# Patient Record
Sex: MALE | Race: WHITE | Employment: FULL TIME | URBAN - METROPOLITAN AREA
[De-identification: names, ages, dates, MRNs, and addresses within clinical notes are randomized per-mention and may not be internally consistent; named-entity substitution may affect disease eponyms.]

---

## 2020-06-18 NOTE — PROGRESS NOTES
[facility]   Cardiac Evaluation      Patient: Anamaria Tavares  YOB: 1950  curing       Chief Complaint   Patient presents with    Chest Pain        Referring provider: Jean Claude Real MD    History of Present Illness:  Mr. Micah Christensen is here at the request of Dr. Meron Gamboa () for evaluation of chest pain. He has a history of hypertension and hyperlipidemia. Additional history includes GERD, back pain/chronic pain, SOUMYA    At his recent visit with PCP, he reported continued chest pain, dyspnea on exertion, and worsening reflux symptoms in the evening after taking PPI in the am.  Omeprazole was increased to BID dosing last month by his PCP. He was diagnosed with sleep apnea years ago, but he could not tolerate CPAP. He was referred again to sleep medicine for re-evaluation. Today, patient describes chest pain as a stabbing chest pain, located on either side of chest, precipitated by emotional stress but not with exertion, no radiation of pain, no associated symptoms. Chest discomfort can last up to a few minutes and resolves without specific treatment. Patient has dyspnea on exertion independent of chest discomfort. He had shoulder, knee, and back surgery within the past 5 years and is in constant pain. The only surgery that was successful was his shoulder. Patient is a nonsmoker. His father had surgery CABG and valve replacement about age 79. With regard to medication therapy he/she has been compliant with prescribed regimen and has tolerated therapy to date. Past Medical History:   has a past medical history of Anxiety, Depression, GERD (gastroesophageal reflux disease), Hypertension, Hypogonadism, Impotence, and Seasonal allergic rhinitis. Surgical History:   has a past surgical history that includes Mandible surgery (fx); Cervical spine surgery (2014); Knee Arthroplasty (Left, 2014); shoulder surgery (Left, 2014); and Knee Arthroplasty (Right, 2019). Current Outpatient Medications   Medication Sig Dispense Refill    traZODone (DESYREL) 50 MG tablet Take 150 mg by mouth nightly      cefadroxil (DURICEF) 500 MG capsule Take 500 mg by mouth 2 times daily      omeprazole (PRILOSEC) 20 MG delayed release capsule Take 20 mg by mouth 2 times daily      amLODIPine (NORVASC) 10 MG tablet Take 10 mg by mouth daily      pravastatin (PRAVACHOL) 40 MG tablet TAKE 1 TABLET BY MOUTH EVERY EVENING 90 tablet 1    HYDROcodone-acetaminophen (NORCO) 7.5-325 MG per tablet Take 1 tablet by mouth. Take 1 tablet by mouth every 6 hours as needed for Pain. Pt had accident and this is an additional med for this month      finasteride (PROPECIA) 1 MG tablet Take 1 tablet by mouth daily. 30 tablet 11     No current facility-administered medications for this visit. Allergies:  Patient has no known allergies.      Social History:  Social History     Socioeconomic History    Marital status:      Spouse name: Not on file    Number of children: Not on file    Years of education: Not on file    Highest education level: Not on file   Occupational History    Not on file   Social Needs    Financial resource strain: Not on file    Food insecurity     Worry: Not on file     Inability: Not on file   [employer] needs     Medical: Not on file     Non-medical: Not on file   Tobacco Use    Smoking status: Never Smoker    Smokeless tobacco: Never Used   Substance and Sexual Activity    Alcohol use: Yes     Comment: 1-2 per month    Drug use: No    Sexual activity: Yes     Partners: Female   Lifestyle    Physical activity     Days per week: Not on file     Minutes per session: Not on file    Stress: Not on file   Relationships    Social connections     Talks on phone: Not on file     Gets together: Not on file     Attends Latter day service: Not on file     Active member of club or organization: Not on file     Attends meetings of clubs or organizations: Not on

## 2020-06-19 ENCOUNTER — OFFICE VISIT (OUTPATIENT)
Dept: CARDIOLOGY CLINIC | Age: 70
End: 2020-06-19
Payer: MEDICARE

## 2020-06-19 VITALS
BODY MASS INDEX: 32.23 KG/M2 | HEART RATE: 79 BPM | WEIGHT: 238 LBS | HEIGHT: 72 IN | DIASTOLIC BLOOD PRESSURE: 88 MMHG | SYSTOLIC BLOOD PRESSURE: 134 MMHG

## 2020-06-19 PROBLEM — R07.9 CHEST PAIN: Status: ACTIVE | Noted: 2020-06-19

## 2020-06-19 PROCEDURE — 99204 OFFICE O/P NEW MOD 45 MIN: CPT | Performed by: INTERNAL MEDICINE

## 2020-06-19 PROCEDURE — 93000 ELECTROCARDIOGRAM COMPLETE: CPT | Performed by: INTERNAL MEDICINE

## 2020-06-19 RX ORDER — CEFADROXIL 500 MG/1
500 CAPSULE ORAL 2 TIMES DAILY
COMMUNITY

## 2020-06-19 RX ORDER — AMLODIPINE BESYLATE 10 MG/1
10 TABLET ORAL DAILY
COMMUNITY

## 2020-06-19 RX ORDER — TRAZODONE HYDROCHLORIDE 50 MG/1
150 TABLET ORAL NIGHTLY
COMMUNITY

## 2020-06-19 RX ORDER — AMLODIPINE BESYLATE 10 MG/1
10 TABLET ORAL DAILY
COMMUNITY
End: 2020-06-19 | Stop reason: CLARIF

## 2020-06-19 RX ORDER — OMEPRAZOLE 20 MG/1
20 CAPSULE, DELAYED RELEASE ORAL 2 TIMES DAILY
COMMUNITY

## 2020-06-19 NOTE — PATIENT INSTRUCTIONS
1.  No change in medications  2. Schedule Myoview GXT soon  3. Will determine the need for follow up after reviewing test results.

## 2020-06-19 NOTE — LETTER
Comment: 1-2 per month    Drug use: No    Sexual activity: Yes     Partners: Female   Lifestyle    Physical activity     Days per week: Not on file     Minutes per session: Not on file    Stress: Not on file   Relationships    Social connections     Talks on phone: Not on file     Gets together: Not on file     Attends Taoism service: Not on file     Active member of club or organization: Not on file     Attends meetings of clubs or organizations: Not on file     Relationship status: Not on file    Intimate partner violence     Fear of current or ex partner: Not on file     Emotionally abused: Not on file     Physically abused: Not on file     Forced sexual activity: Not on file   Other Topics Concern    Not on file   Social History Narrative    Not on file       Family History:   Family History   Problem Relation Age of Onset    Cancer Mother     Cervical Cancer Mother     Other Father         valvr replacement    Alzheimer's Disease Father     Stroke Father     Coronary Art Dis Father      Family history has been reviewed and not pertinent except as noted above. Review of Systems:   · Constitutional: there has been no unanticipated weight loss. No change in energy or activity level   · Eyes: No visual changes   · ENT: No Headaches, hearing loss or vertigo. No mouth sores or sore throat. · Cardiovascular: Reviewed in HPI  · Respiratory: No cough or wheezing, no sputum production. BORDEN  · Gastrointestinal: No abdominal pain, appetite loss, blood in stools. No change in bowel or bladder habits. · Genitourinary: No nocturia, dysuria, trouble voiding  · Musculoskeletal:  No gait disturbance, weakness or joint complaints. Has joint pain  · Integumentary: No rash or pruritis. · Neurological: No headache, change in muscle strength, numbness or tingling. No change in gait, balance, coordination, mood, affect, memory, mentation, behavior.   · Psychiatric: No anxiety or depression

## 2020-06-29 ENCOUNTER — HOSPITAL ENCOUNTER (OUTPATIENT)
Dept: NON INVASIVE DIAGNOSTICS | Age: 70
Discharge: HOME | End: 2020-06-29
Payer: MEDICARE

## 2020-06-29 LAB
LV EF: 70 %
LVEF MODALITY: NORMAL

## 2020-06-29 PROCEDURE — A9502 TC99M TETROFOSMIN: HCPCS | Performed by: INTERNAL MEDICINE

## 2020-06-29 PROCEDURE — 78452 HT MUSCLE IMAGE SPECT MULT: CPT | Performed by: INTERNAL MEDICINE

## 2020-06-29 PROCEDURE — 93017 CV STRESS TEST TRACING ONLY: CPT | Performed by: INTERNAL MEDICINE

## 2020-06-29 PROCEDURE — 3430000000 HC RX DIAGNOSTIC RADIOPHARMACEUTICAL: Performed by: INTERNAL MEDICINE

## 2020-06-29 PROCEDURE — 6360000002 HC RX W HCPCS: Performed by: INTERNAL MEDICINE

## 2020-06-29 RX ORDER — AMINOPHYLLINE DIHYDRATE 25 MG/ML
100 INJECTION, SOLUTION INTRAVENOUS ONCE
Status: COMPLETED | OUTPATIENT
Start: 2020-06-29 | End: 2020-06-29

## 2020-06-29 RX ADMIN — AMINOPHYLLINE DIHYDRATE 100 MG: 25 INJECTION, SOLUTION INTRAVENOUS at 14:28

## 2020-06-29 RX ADMIN — REGADENOSON 0.4 MG: 0.08 INJECTION, SOLUTION INTRAVENOUS at 14:25

## 2020-06-29 RX ADMIN — TETROFOSMIN 30 MILLICURIE: 1.38 INJECTION, POWDER, LYOPHILIZED, FOR SOLUTION INTRAVENOUS at 14:29

## 2020-06-29 RX ADMIN — TETROFOSMIN 10 MILLICURIE: 1.38 INJECTION, POWDER, LYOPHILIZED, FOR SOLUTION INTRAVENOUS at 12:52

## 2022-04-26 ENCOUNTER — OFFICE VISIT (OUTPATIENT)
Dept: NEUROLOGY | Age: 72
End: 2022-04-26
Payer: MEDICARE

## 2022-04-26 VITALS
WEIGHT: 238 LBS | BODY MASS INDEX: 32.23 KG/M2 | HEART RATE: 56 BPM | DIASTOLIC BLOOD PRESSURE: 102 MMHG | SYSTOLIC BLOOD PRESSURE: 159 MMHG | HEIGHT: 72 IN

## 2022-04-26 DIAGNOSIS — G31.84 MILD COGNITIVE IMPAIRMENT, SO STATED: Primary | ICD-10-CM

## 2022-04-26 DIAGNOSIS — G47.33 OBSTRUCTIVE SLEEP APNEA: ICD-10-CM

## 2022-04-26 DIAGNOSIS — R41.3 MEMORY LOSS: ICD-10-CM

## 2022-04-26 PROCEDURE — 99204 OFFICE O/P NEW MOD 45 MIN: CPT | Performed by: PSYCHIATRY & NEUROLOGY

## 2022-04-26 RX ORDER — LISINOPRIL 20 MG/1
TABLET ORAL
COMMUNITY
Start: 2022-04-15 | End: 2022-10-03

## 2022-04-26 RX ORDER — DULOXETIN HYDROCHLORIDE 60 MG/1
CAPSULE, DELAYED RELEASE ORAL
COMMUNITY
Start: 2022-02-11

## 2022-04-26 NOTE — PROGRESS NOTES
NEUROLOGY CONSULTATION     Chief Complaint   Patient presents with    Memory Loss       HISTORY OF PRESENT ILLNESS :    Israel Bonner is a 81 y.o. male who is referred by Dr. Nakita Gomez   History was obtained from patient  Additional history was obtained from his family. Patient was referred for evaluation of memory impairment. Patient stated the symptoms started approximately 2007. Onset was gradual but the symptoms are slowly getting worse. He had some testing done at [facility].  He was not satisfied with the results. Patient has obstructive sleep apnea but does not use a CPAP machine. Patient was tested several years ago for that. Patient also has significant cervical spine issues and has had cervical spine fusion. Patient has chronic pain and is on hydrocodone. Patient has hypertension and takes Norvasc. She has hyperlipidemia and is on Pravachol. He uses Desyrel   Patient has diabetes and is on metformin.     REVIEW OF SYSTEMS    Constitutional:  []   Chills   [x]  Fatigue   []  Fevers   []  Malaise   []  Weight loss     [] Denies all of the above    Eyes:  [x]  Double vision   [x]  Blurry vision     [] Denies all of the above    Ears, nose, mouth, throat, and face:   [x] Hearing loss    []   Hoarseness      []  Snoring    [x]  Tinnitus       [] Denies all of the above     Respiratory:   [x]  Cough    [x]  Shortness of breath         [] Denies all of the above     Cardiovascular:   [x]  Chest pain    [x]  Exertional chest pressure/discomfort           [] Palpitations    []  Syncope     [] Denies all of the above    Gastrointestinal:   []  Abdominal pain   [x]  Constipation    []  Diarrhea    []   Dysphagia                      [] Denies all of the above    Genitourinary:      [x]  Frequency   []  Hematuria     [x]  Urinary incontinence           [] Denies all of the above     Hematologic/lymphatic:  []  Bleeding []  Easy bruising   []  Anemia  [x] Denies all of the above     Musculoskeletal:   [x] Back pain       []  Myalgias    [x]  Neck pain           [] Denies all of the above    Neurological: As noted in HPI    Behavioral/Psych:   [x] Anxiety    [x]  Depression     []  Mood swings     [] Denies all of the above     Endocrine:   []  Temperature intolerance     [x] Fatigue      [] Denies all of the above     Allergic/Immunologic:   [] Hay fever    [x] Denies all of the above     Past Medical History:   Diagnosis Date    Anxiety     Depression     GERD (gastroesophageal reflux disease)     Hypertension     Hypogonadism     Impotence     Memory disorder     Seasonal allergic rhinitis      Family History   Problem Relation Age of Onset    Cancer Mother     Cervical Cancer Mother     Other Father         valvr replacement    Alzheimer's Disease Father     Stroke Father     Coronary Art Dis Father      Social History     Socioeconomic History    Marital status:      Spouse name: None    Number of children: None    Years of education: None    Highest education level: None   Occupational History    None   Tobacco Use    Smoking status: Never Smoker    Smokeless tobacco: Never Used   Vaping Use    Vaping Use: Never used   Substance and Sexual Activity    Alcohol use: Yes     Comment: 1-2 per month    Drug use: No    Sexual activity: Yes     Partners: Female   Other Topics Concern    None   Social History Narrative    None     Social Determinants of Health     Financial Resource Strain:     Difficulty of Paying Living Expenses: Not on file   Food Insecurity:     Worried About Running Out of Food in the Last Year: Not on file    Deedee of Food in the Last Year: Not on file   Transportation Needs:     Lack of Transportation (Medical): Not on file    Lack of Transportation (Non-Medical):  Not on file   Physical Activity:     Days of Exercise per Week: Not on file    Minutes of Exercise per Session: Not on file   Stress:     Feeling of Stress : Not on file   Social Connections:     Frequency of Communication with Friends and Family: Not on file    Frequency of Social Gatherings with Friends and Family: Not on file    Attends Caodaism Services: Not on file    Active Member of [address] [place] [address] or Organizations: Not on file    Attends Club or Organization Meetings: Not on file    Marital Status: Not on file   Intimate Partner Violence:     Fear of Current or Ex-Partner: Not on file    Emotionally Abused: Not on file    Physically Abused: Not on file    Sexually Abused: Not on file   Housing Stability:     Unable to Pay for Housing in the Last Year: Not on file    Number of [place] in the Last Year: Not on file    Unstable Housing in the Last Year: Not on file       PHYSICAL EXAMINATION:  BP (!) 159/102   Pulse 56   Ht 6' (1.829 m)   Wt 238 lb (108 kg)   BMI 32.28 kg/m²   Appearance: Well appearing, well nourished and in no distress  Mental Status Exam: Patient is alert, oriented to person, place and time. Recent and remote memory is normal she could recall 3 out of 3 objects in 3 minutes. She was unable to do serial sevens. Fund of Knowledge is normal  Attention/concentration is normal.   Speech : No dysarthria  Language : No aphasia  Patient scored 23 out of 30 on a Mini-Mental status testing. Funduscopic Exam: sharp disc margins  Cranial Nerves:   II: Visual fields:  Full to confrontation  III: Pupils:  equal, round, reactive to light  III,IV,VI: Extra Ocular Movements are intact.  No nystagmus  V: Facial sensation is intact to pin prick and light touch  VII: Facial strength and movements: intact and symmetric smile,cheek puffing and eyebrow elevation  VIII: Hearing:  Intact to finger rub bilaterally  IX: Palate  elevation is symmetric  XI: Shoulder shrug is intact  XII: Tongue movements are normal  Motor:  Muscle tone and bulk are normal.   Strength is symmetrical 5/5 in all four extremities. Sensory: Intact to light touch and  pin prick in all four extremities  Coordination:  Normal  Finger to Nose and Heel to Shin bilaterally    . Reflexes:  DTR 1 and symmetric bilaterally  Plantar response: Withdrawal response bilaterally  Gait: Significant difficulty because of his cervical spinal cord problems  Romberg: Could not be tested  Vascular: No carotid bruit bilaterally        DATA:  Testing from outside hospital was reviewed. Patient has not had any recent brain scans done. IMPRESSION :  Mild cognitive impairment versus early dementia  Patient scored 23 out of 30 on the Mini-Mental status testing in the office today  Obstructive sleep apnea  Rule out metabolic etiology  Rule out any frontal lobe lesion    RECOMMENDATIONS :  Discussed with patient and his family  I will get a CT head with contrast  I will check TSH and B12 levels  I will refer the patient for the sleep study  I have recommended that he take the least amount of pain medication possible  I will see him back in 4 months for follow-up  Thank you for this consultation        Please note a portion of this chart was generated using dragon dictation software. Although every effort was made to ensure the accuracy of this automated transcription, some errors in transcription may have occurred.

## 2022-06-15 ENCOUNTER — TELEPHONE (OUTPATIENT)
Dept: NEUROLOGY | Age: 72
End: 2022-06-15

## 2022-06-15 NOTE — TELEPHONE ENCOUNTER
[facility] called pt needs a pa on CT of Head done 06/15.   Per PA dept states his insurance has 7 days

## 2022-06-17 NOTE — TELEPHONE ENCOUNTER
Spoke with pt insurance Michelle who redirected me to [facility] to generate a PA for CT scan. After speaking with Sarah MELISSA at [facility], I was informed if the pt received the CT scan prior to PA approval then the plan will not approve the scan. I contacted pt to confirm rather or not he has had the CT done already and the pt stated that he had. CT scan done 6/16/2022    I have advised pt of the denial and that he may received a hefty bill from [place]. Pt stated that he will not pay it but he does understand it.     Reference number giver was representative  Name and date: [id number]/17/2022

## 2022-08-30 ENCOUNTER — OFFICE VISIT (OUTPATIENT)
Dept: NEUROLOGY | Age: 72
End: 2022-08-30
Payer: MEDICARE

## 2022-08-30 VITALS
HEART RATE: 98 BPM | BODY MASS INDEX: 31.15 KG/M2 | WEIGHT: 230 LBS | HEIGHT: 72 IN | DIASTOLIC BLOOD PRESSURE: 84 MMHG | SYSTOLIC BLOOD PRESSURE: 131 MMHG

## 2022-08-30 DIAGNOSIS — G47.33 OBSTRUCTIVE SLEEP APNEA: ICD-10-CM

## 2022-08-30 DIAGNOSIS — G31.84 MILD COGNITIVE IMPAIRMENT, SO STATED: Primary | ICD-10-CM

## 2022-08-30 DIAGNOSIS — R41.3 MEMORY LOSS: ICD-10-CM

## 2022-08-30 PROCEDURE — 1123F ACP DISCUSS/DSCN MKR DOCD: CPT | Performed by: PSYCHIATRY & NEUROLOGY

## 2022-08-30 PROCEDURE — 99214 OFFICE O/P EST MOD 30 MIN: CPT | Performed by: PSYCHIATRY & NEUROLOGY

## 2022-08-30 NOTE — PROGRESS NOTES
[facility]   Neurology followup    Subjective:   CC/HP  History was obtained from the patient. Additional history was obtained from his wife. Patient is here for follow-up visit. She is main concern is neck pain and bilateral shoulder pain. Patient has had previous cervical disc surgery and is being followed by neurosurgery. He is in pain and takes pain medication. He still has memory problems. It seems to be slowly improving compared to the time that she was right after the surgery. Patient had a CT head done at an outside hospital which showed mild lateral ventricular enlargement felt to be due to cerebral atrophy.   TSH and B12 levels were normal.    REVIEW OF SYSTEMS    Constitutional:  []   Chills   [x]  Fatigue   []  Fevers   []  Malaise   []  Weight loss     [] Denies all of the above    Respiratory:   []  Cough    [x]  Shortness of breath         [] Denies all of the above     Cardiovascular:   []  Chest pain    []  Exertional chest pressure/discomfort           [] Palpitations    []  Syncope     [x] Denies all of the above        Past Medical History:   Diagnosis Date    Anxiety     Depression     GERD (gastroesophageal reflux disease)     Hypertension     Hypogonadism     Impotence     Memory disorder     Seasonal allergic rhinitis      Family History   Problem Relation Age of Onset    Cancer Mother     Cervical Cancer Mother     Other Father         valvr replacement    Alzheimer's Disease Father     Stroke Father     Coronary Art Dis Father      Social History     Socioeconomic History    Marital status:    Tobacco Use    Smoking status: Never    Smokeless tobacco: Never   Vaping Use    Vaping Use: Never used   Substance and Sexual Activity    Alcohol use: Yes     Comment: 1-2 per month    Drug use: No    Sexual activity: Yes     Partners: Female        Objective:  Exam:  /84   Pulse 98   Ht 6' (1.829 m)   Wt 230 lb (104.3 kg)   BMI 31.19 kg/m²   This is a well-nourished patient in no acute distress  Patient is awake, alert and oriented x3. Speech is normal.  Pupils are equal round reacting to light. Extraocular movements intact. Face symmetrical. Tongue midline. Motor exam shows normal symmetrical strength. Deep tendon reflexes normal. Plantar reflexes downgoing. Sensory exam normal. Coordination normal. Gait normal. No carotid bruit. No neck stiffness. Data :  LABS:  General Labs:  CBC:   Lab Results   Component Value Date/Time    WBC 10.3 04/16/2013 04:18 PM    RBC 4.42 04/16/2013 04:18 PM    HGB 13.8 04/16/2013 04:18 PM    HCT 41.7 04/16/2013 04:18 PM    MCV 94.4 04/16/2013 04:18 PM    MCH 31.1 04/16/2013 04:18 PM    MCHC 33.0 04/16/2013 04:18 PM    RDW 13.5 04/16/2013 04:18 PM     04/16/2013 04:18 PM    MPV 9.6 04/16/2013 04:18 PM     BMP:    Lab Results   Component Value Date/Time     04/16/2013 04:18 PM    K 4.8 04/16/2013 04:18 PM     04/16/2013 04:18 PM    CO2 24 04/16/2013 04:18 PM    BUN 16 04/16/2013 04:18 PM    LABALBU 4.8 04/16/2013 04:18 PM    CREATININE 1.0 04/16/2013 04:18 PM    CALCIUM 10.2 04/16/2013 04:18 PM    GFRAA >60 04/16/2013 04:18 PM    GLUCOSE 102 04/16/2013 04:18 PM     RADIOLOGY REVIEW:  I have reviewed radiology report(s) of: CT scan of the head from June 2022    Impression :  Mild cognitive impairment  Lateral ventricular enlargement seen on CT head felt to be due to cerebral atrophy  Obstructive sleep apnea  Status post cervical surgery with still persistent neck pain and bilateral shoulder pain    Plan :  Patient and his family are obviously frustrated that nobody has been able to help him with the neck pain and the shoulder symptoms. Patient will follow with his neurosurgeon for that. I have recommended a repeat CT head to be done maybe in the spring 2023 and to compare it to the previous CT head to see about the ventricular enlargement. This can be done through his primary care physician.   If there is any significant change in the CT size and I will be happy to follow him. I would still recommend that he follow-up with a sleep medicine doctor and get tested again for sleep apnea. This would also help his memory to some extent. Please note a portion of  this chart was generated using dragon dictation software. Although every effort was made to ensure the accuracy of this automated transcription, some errors in transcription may have occurred.

## 2022-10-03 ENCOUNTER — OFFICE VISIT (OUTPATIENT)
Dept: PULMONOLOGY | Age: 72
End: 2022-10-03
Payer: MEDICARE

## 2022-10-03 VITALS
WEIGHT: 234 LBS | BODY MASS INDEX: 31.69 KG/M2 | HEIGHT: 72 IN | SYSTOLIC BLOOD PRESSURE: 140 MMHG | OXYGEN SATURATION: 97 % | DIASTOLIC BLOOD PRESSURE: 80 MMHG | HEART RATE: 102 BPM

## 2022-10-03 DIAGNOSIS — G47.33 OBSTRUCTIVE SLEEP APNEA SYNDROME: Primary | ICD-10-CM

## 2022-10-03 DIAGNOSIS — K21.9 GASTROESOPHAGEAL REFLUX DISEASE WITHOUT ESOPHAGITIS: Chronic | ICD-10-CM

## 2022-10-03 DIAGNOSIS — E66.9 NON MORBID OBESITY, UNSPECIFIED OBESITY TYPE: Chronic | ICD-10-CM

## 2022-10-03 DIAGNOSIS — I10 PRIMARY HYPERTENSION: Chronic | ICD-10-CM

## 2022-10-03 DIAGNOSIS — F11.90 CHRONIC NARCOTIC USE: Chronic | ICD-10-CM

## 2022-10-03 PROCEDURE — 1123F ACP DISCUSS/DSCN MKR DOCD: CPT | Performed by: INTERNAL MEDICINE

## 2022-10-03 PROCEDURE — 99204 OFFICE O/P NEW MOD 45 MIN: CPT | Performed by: INTERNAL MEDICINE

## 2022-10-03 ASSESSMENT — SLEEP AND FATIGUE QUESTIONNAIRES
HOW LIKELY ARE YOU TO NOD OFF OR FALL ASLEEP WHILE SITTING AND TALKING TO SOMEONE: 0
HOW LIKELY ARE YOU TO NOD OFF OR FALL ASLEEP WHILE SITTING INACTIVE IN A PUBLIC PLACE: 0
HOW LIKELY ARE YOU TO NOD OFF OR FALL ASLEEP WHILE LYING DOWN TO REST IN THE AFTERNOON WHEN CIRCUMSTANCES PERMIT: 2
ESS TOTAL SCORE: 9
HOW LIKELY ARE YOU TO NOD OFF OR FALL ASLEEP WHILE SITTING AND READING: 2
HOW LIKELY ARE YOU TO NOD OFF OR FALL ASLEEP WHILE SITTING QUIETLY AFTER LUNCH WITHOUT ALCOHOL: 3
HOW LIKELY ARE YOU TO NOD OFF OR FALL ASLEEP WHILE WATCHING TV: 2
HOW LIKELY ARE YOU TO NOD OFF OR FALL ASLEEP WHEN YOU ARE A PASSENGER IN A CAR FOR AN HOUR WITHOUT A BREAK: 0
HOW LIKELY ARE YOU TO NOD OFF OR FALL ASLEEP IN A CAR, WHILE STOPPED FOR A FEW MINUTES IN TRAFFIC: 0

## 2022-10-03 NOTE — PROGRESS NOTES
Jose Elias Reynoso [facility]  [address] [facility], [address] [facility]- [phone]   [facility] Shaun Beal. [address] [place] [address]. Vi Bull [phone]     [address]  [address] [address]  Dept: [phone]  Loc: [phone]    Assessment:      Visit Diagnoses and Associated Orders       Obstructive sleep apnea syndrome   (New Problem)  -  Primary    Needs work up    Baseline Diagnostic Sleep Study [14126 Custom]   - Future Order         Primary hypertension   (Stable)           Gastroesophageal reflux disease without esophagitis   (Stable)           Chronic narcotic use   (Stable)      Baseline Diagnostic Sleep Study [63128 Custom]   - Future Order         Non morbid obesity, unspecified obesity type   (Not Stable)                    Plan:      One or more undiagnosed new problem with uncertain prognosis till final diagnosis is made. Differential diagnosis includes but not limited to: SOUMYA, PLMD's, narcolepsy, parasomnias. Reviewed SOUMYA (which is the highest likelihood diagnosis): pathophysiology, diagnosis, complications and treatment. Instructed him not to drive if drowsy. Continue medications per his PCP and other physicians. Limit caffeine use after 3pm. Will do PSG to rule-out SOUMYA and other sleep disorders. 1 wk follow up after study to review his results. The chronic medical conditions listed are directly related to the primary diagnosis listed above. The management of the primary diagnosis affects the secondary diagnosis and vice versa. Patient is a chronic long-acting narcotics that cannot be weaned. We will need to watch for possible central sleep apnea. Home sleep testing is contraindicated with chronic narcotics needs in lab PSG.     Reviewed referral of the patient's neurologist dated 8/30/2022 showed the patient with mild cognitive impairment and memory issues. Reviewed the following sternal labs from 5/3/2022: CBC and CMP-essentially normal except for elevated glucose and mild anemia. This information was analyzed to assess complexity and medical decision making in regards to further testing and management. Continue meds for: hypertension and GERD. Pt would medically benefit from wt loss for SOUMYA (diet, exercise, surgical). Orders Placed This Encounter   Procedures    Baseline Diagnostic Sleep Study          Subjective:     Patient ID: Bryce Phoenix is a 81 y.o. male. Chief Complaint   Patient presents with    Snoring       HPI:      Bryce Phoenix is a 81 y.o. male referred by Alex Gallo MD for a sleep evaluation. He complains of: snoring, excessive daytime sleepiness , non-restorative sleep, nocturia, AM headaches, napping, and tossing and turning at night. He denies: cataplexy and hypnagogic hallucinations. Symptoms began several years ago, gradually worsening since that time. Has chronic pain issues and takes daily chronic narcotics. Takes trazodone 150 mg to sleep at nighttime but often will sleep throughout the day as well because of his pain and some possible depression. DOT/CDL - no  FAA/'s license -no    Previous Report(s) Reviewed: historical medical records, office notes, andreferral letter(s). Pertinent data has been documented. [place] Sleepiness Score: 9    Caffeine Intake - None.     Social History     Socioeconomic History    Marital status:      Spouse name: Not on file    Number of children: Not on file    Years of education: Not on file    Highest education level: Not on file   Occupational History    Not on file   Tobacco Use    Smoking status: Never    Smokeless tobacco: Never   Vaping Use    Vaping Use: Never used   Substance and Sexual Activity    Alcohol use: Yes     Comment: 1-2 per month    Drug use: No    Sexual activity: Yes     Partners: Female   Other Topics Concern    Not on file Social History Narrative    Not on file     Social Determinants of Health     Financial Resource Strain: Not on file   Food Insecurity: Not on file   Transportation Needs: Not on file   Physical Activity: Not on file   Stress: Not on file   Social Connections: Not on file   Intimate Partner Violence: Not on file   Housing Stability: Not on file        Current Outpatient Medications   Medication Instructions    amLODIPine (NORVASC) 10 mg, Oral, DAILY    cefadroxil (DURICEF) 500 mg, Oral, 2 TIMES DAILY    DULoxetine (CYMBALTA) 60 MG extended release capsule TAKE 1 CAPSULE BY MOUTH DAILY    finasteride (PROPECIA) 1 mg, Oral, DAILY    HYDROcodone-acetaminophen (NORCO)  MG per tablet 1 tablet, Oral, Take 1 tablet by mouth every 6 hours as needed for Pain. Pt had accident and this is an additional med for this month    omeprazole (PRILOSEC) 20 mg, Oral, 2 TIMES DAILY    pravastatin (PRAVACHOL) 40 MG tablet TAKE 1 TABLET BY MOUTH EVERY EVENING    traZODone (DESYREL) 150 mg, Oral, NIGHTLY          Objective:     Vitals:  Weight BMI   Wt Readings from Last 3 Encounters:   10/03/22 234 lb (106.1 kg)   08/30/22 230 lb (104.3 kg)   04/26/22 238 lb (108 kg)    Body mass index is 31.74 kg/m². BP HR SaO2   BP Readings from Last 3 Encounters:   10/03/22 (!) 140/80   08/30/22 131/84   04/26/22 (!) 159/102    Pulse Readings from Last 3 Encounters:   10/03/22 (!) 102   08/30/22 98   04/26/22 56    SpO2 Readings from Last 3 Encounters:   10/03/22 97%        Physical Exam  Vitals reviewed. Constitutional:       General: He is not in acute distress. Appearance: Normal appearance. He is well-developed. He is not toxic-appearing or diaphoretic. HENT:      Head: Normocephalic and atraumatic. Not macrocephalic and not microcephalic. Right Ear: External ear normal.      Left Ear: External ear normal.      Nose: Septal deviation and mucosal edema present. No nasal deformity. Mouth/Throat:      Lips: Pink. Mouth: Mucous membranes are moist.      Tongue: No lesions. Palate: No mass. Pharynx: Uvula midline. Uvula swelling present. No oropharyngeal exudate. Tonsils: No tonsillar exudate or tonsillar abscesses. Comments: Tonsils: normal size  Eyes:      General: Lids are normal.      Extraocular Movements: Extraocular movements intact. Conjunctiva/sclera: Conjunctivae normal.      Pupils: Pupils are equal, round, and reactive to light. Neck:      Vascular: No JVD. Trachea: Trachea normal.      Comments: Neck Circ: 17 inches    Cardiovascular:      Rate and Rhythm: Normal rate and regular rhythm. Heart sounds: Normal heart sounds. Pulmonary:      Effort: Pulmonary effort is normal.      Breath sounds: Normal breath sounds. Abdominal:      General: Bowel sounds are normal.   Musculoskeletal:      Cervical back: Normal range of motion. Comments: No evidence of cyanosis or clubbing of nails   Skin:     General: Skin is warm. Nails: There is no clubbing. Neurological:      General: No focal deficit present. Mental Status: He is alert. Psychiatric:         Attention and Perception: Attention normal.         Mood and Affect: Mood and affect normal.         Speech: Speech normal.         Behavior: Behavior normal. Behavior is cooperative. Thought Content:  Thought content normal.         Electronically signed by Devante Alicia MD on10/3/2022 at 1:43 PM

## 2022-10-03 NOTE — LETTER
[facility]  [address] [facility]  Melissa Velas[address] [address]  Phone: [phone]  Fax: [phone]           Kenna Vazquez MD      October 3, 2022     Patient: Alton Barron   MR Number: [id number]   YOB: 1950   Date of Visit: 10/3/2022       Dear Dr. Henry Ruff: Thank you for referring Andriy Barney to me for evaluation/treatment. Below are the relevant portions of my assessment and plan of care. Visit Diagnoses and Associated Orders       Obstructive sleep apnea syndrome   (New Problem)  -  Primary    Needs work up    Home Sleep Study (HST) [22188 Custom]   - Future Order         Primary hypertension   (Stable)           Gastroesophageal reflux disease without esophagitis   (Stable)           Chronic narcotic use   (Stable)           Non morbid obesity, unspecified obesity type   (Not Stable)                   One or more undiagnosed new problem with uncertain prognosis till final diagnosis is made. Differential diagnosis includes but not limited to: SOUMYA, PLMD's, narcolepsy, parasomnias. Reviewed SOUMYA (which is the highest likelihood diagnosis): pathophysiology, diagnosis, complications and treatment. Instructed him not to drive if drowsy. Continue medications per his PCP and other physicians. Limit caffeine use after 3pm. Will do PSG to rule-out SOUMYA and other sleep disorders. 1 wk follow up after study to review his results. The chronic medical conditions listed are directly related to the primary diagnosis listed above. The management of the primary diagnosis affects the secondary diagnosis and vice versa. Patient is a chronic long-acting narcotics that cannot be weaned. We will need to watch for possible central sleep apnea. Home sleep testing is contraindicated with chronic narcotics needs in lab PSG. Reviewed referral of the patient's neurologist dated 8/30/2022 showed the patient with mild cognitive impairment and memory issues.     Reviewed the following sternal labs from 5/3/2022: CBC and CMP-essentially normal except for elevated glucose and mild anemia. This information was analyzed to assess complexity and medical decision making in regards to further testing and management. Continue meds for: hypertension and GERD. Pt would medically benefit from wt loss for SOUMYA (diet, exercise, surgical). Orders Placed This Encounter   Procedures    Home Sleep Study (HST)       If you have questions, please do not hesitate to call me. I look forward to following Jonathan Beltran along with you.     Sincerely,        Shauna Aguirre MD    CC providers:  Zeeshan Oglesby MD  [address] [place] [address], [address]  [facility] [address]/ [facility] [address] [id number]  Via In [address] [place] [address] [id number]  Megha Dobson [address]  Via Fax: [phone]

## 2022-10-04 ENCOUNTER — TELEPHONE (OUTPATIENT)
Dept: SLEEP CENTER | Age: 72
End: 2022-10-04

## 2022-11-16 ENCOUNTER — HOSPITAL ENCOUNTER (OUTPATIENT)
Dept: SLEEP CENTER | Age: 72
Discharge: HOME | End: 2022-11-16
Payer: MEDICARE

## 2022-11-16 DIAGNOSIS — G47.33 OBSTRUCTIVE SLEEP APNEA (ADULT) (PEDIATRIC): ICD-10-CM

## 2022-11-16 DIAGNOSIS — G47.33 OBSTRUCTIVE SLEEP APNEA SYNDROME: ICD-10-CM

## 2022-11-16 DIAGNOSIS — F11.90 CHRONIC NARCOTIC USE: Chronic | ICD-10-CM

## 2022-11-16 PROCEDURE — 95810 POLYSOM 6/> YRS 4/> PARAM: CPT

## 2022-11-20 DIAGNOSIS — G47.33 OBSTRUCTIVE SLEEP APNEA (ADULT) (PEDIATRIC): Primary | ICD-10-CM

## 2022-11-20 PROCEDURE — 95811 POLYSOM 6/>YRS CPAP 4/> PARM: CPT | Performed by: INTERNAL MEDICINE

## 2022-11-22 ENCOUNTER — TELEPHONE (OUTPATIENT)
Dept: PULMONOLOGY | Age: 72
End: 2022-11-22

## 2022-11-23 ENCOUNTER — TELEPHONE (OUTPATIENT)
Dept: PULMONOLOGY | Age: 72
End: 2022-11-23

## 2022-12-07 ENCOUNTER — TELEPHONE (OUTPATIENT)
Dept: PULMONOLOGY | Age: 72
End: 2022-12-07

## 2022-12-20 ENCOUNTER — TELEPHONE (OUTPATIENT)
Dept: PULMONOLOGY | Age: 72
End: 2022-12-20

## 2022-12-20 NOTE — PROGRESS NOTES
Delphine Henry         : 1950    Diagnosis: [x] SOUMYA (G47.33) [] CSA (G47.31) [] Apnea (G47.30)   Length of Need: [x] 18 Months [] 99 Months [] Other:    Machine (LOVE!): [] Respironics Dream Station   2   Auto [x] [employer] S11 [] Other:     []  CPAP () [x] Bilevel ()   Mode: [] Auto [] Spontaneous    Mode: [x] Auto [] Spontaneous       IPAP max 25 cmH2O  EPAP min 18 cmH2O  PS 3 cmH2O     Comfort Settings:   - Ramp Pressure: 9 cmH2O                                        - Ramp time: 15 min                                     -  Flex/EPR - 3 full time                                    - For ResMed Bilevel (TiMax-4 sec   TiMin- 0.2 sec)     Humidifier: [x] Heated ()        [x] Water chamber replacement ()/ 1 per 6 months        Mask:   [] Nasal () /1 per 3 months [x] Full Face () /1 per 3 months   [] Patient choice -Size and fit mask [x] Patient Choice - Size and fit mask   [] Dispense:  [] Dispense:    [] Headgear () / 1 per 3 months [x] Headgear () / 1 per 3 months   [] Replacement Nasal Cushion ()/2 per month [x] Interface Replacement ()/1 per month   [] Replacement Nasal Pillows ()/2 per month         Tubing: [x] Heated ()/1 per 3 months    [] Standard ()/1 per 3 months [] Other:           Filters: [x] Non-disposable ()/1 per 6 months     [x] Ultra-Fine, Disposable ()/2 per month        Miscellaneous: [] Chin Strap ()/ 1 per 6 months [] O2 bleed-in:       LPM   [] Oximetry on CPAP/Bilevel []  Other:    [x] Modem: ()         Start Order Date: 22    MEDICAL JUSTIFICATION:  I, the undersigned, certify that the above prescribed supplies are medically necessary for this patients wellbeing. In my opinion, the supplies are both reasonable and necessary in reference to accepted standards of medicalpractice in treatment of this patients condition.     Talita Vicente MD      NPI: [id number]       Order Signed Date: 22    Electronically signed by Jose Manuel Slade MD on 2022 at 12:50 PM    [address]  1950  [address] [place] [address]  [phone] (home)   [phone] (mobile)      Insurance Info (confirm with patient if correct):  Payer/Plan Subscr  Sex Relation Sub.  Ins. ID Effective Group Num

## 2022-12-20 NOTE — TELEPHONE ENCOUNTER
Pt returning call to review Emergency Split Night Report. Order to be sent to [address] [place] [address]. Copy of sleep study  results were  faxed to pt's PCP as requested per pt. . Pt to schedule a 30-90 day f/u with Dr. Alex Millard office.

## 2023-05-01 ENCOUNTER — OFFICE VISIT (OUTPATIENT)
Dept: ORTHOPEDIC SURGERY | Age: 73
End: 2023-05-01
Payer: MEDICARE

## 2023-05-01 VITALS — WEIGHT: 248 LBS | BODY MASS INDEX: 33.59 KG/M2 | HEIGHT: 72 IN

## 2023-05-01 DIAGNOSIS — M17.12 PRIMARY OSTEOARTHRITIS OF LEFT KNEE: Primary | ICD-10-CM

## 2023-05-01 DIAGNOSIS — M17.11 PRIMARY OSTEOARTHRITIS OF RIGHT KNEE: ICD-10-CM

## 2023-05-01 DIAGNOSIS — F11.90 CHRONIC NARCOTIC USE: ICD-10-CM

## 2023-05-01 PROCEDURE — 1123F ACP DISCUSS/DSCN MKR DOCD: CPT | Performed by: PHYSICIAN ASSISTANT

## 2023-05-01 PROCEDURE — 99204 OFFICE O/P NEW MOD 45 MIN: CPT | Performed by: PHYSICIAN ASSISTANT

## 2023-05-01 PROCEDURE — 20610 DRAIN/INJ JOINT/BURSA W/O US: CPT | Performed by: PHYSICIAN ASSISTANT

## 2023-05-01 RX ORDER — TRIAMCINOLONE ACETONIDE 40 MG/ML
40 INJECTION, SUSPENSION INTRA-ARTICULAR; INTRAMUSCULAR ONCE
Status: COMPLETED | OUTPATIENT
Start: 2023-05-01 | End: 2023-05-01

## 2023-05-01 RX ORDER — BUPIVACAINE HYDROCHLORIDE 2.5 MG/ML
1 INJECTION, SOLUTION INFILTRATION; PERINEURAL ONCE
Status: COMPLETED | OUTPATIENT
Start: 2023-05-01 | End: 2023-05-01

## 2023-05-01 RX ADMIN — BUPIVACAINE HYDROCHLORIDE 2.5 MG: 2.5 INJECTION, SOLUTION INFILTRATION; PERINEURAL at 16:24

## 2023-05-01 RX ADMIN — TRIAMCINOLONE ACETONIDE 40 MG: 40 INJECTION, SUSPENSION INTRA-ARTICULAR; INTRAMUSCULAR at 16:25

## 2023-05-01 NOTE — PROGRESS NOTES
Administrations This Visit       bupivacaine (MARCAINE) 0.25 % injection 2.5 mg       Admin Date  05/01/2023  16:24 Action  Given Dose  2.5 mg Route  Intra-artICUlar Site  Knee Right Administered By  Nick Rom    Ordering Provider: Santa RinconLit [place]: [id number]    Lot#: [id number]    [facility]    Patient Supplied?: No              triamcinolone acetonide (KENALOG-40) injection 40 mg       Admin Date  05/01/2023  16:25 Action  Given Dose  40 mg Route  Intra-artICUlar Site  Knee Right Administered By  Nick Rom    Ordering Provider: Santa Krishnamurthy    NDC: 1053-0849-19    Lot#: [id number]    : [employer] (PRIMARY CARE)    Patient Supplied?: No

## 2023-05-01 NOTE — PROGRESS NOTES
New Patient Knee Visit  Date: 5/1/2023    CHIEF COMPLAINT: Bilateral knee pain, left greater than right. HPI: This is a 72 y.o. male who has had bilateral knee pain for the past several years. The patient denies any inciting injury. The patient has had bilateral knee arthroscopies with partial meniscectomies in 2014 and 2018, unsure which knee was first.  He states he did not experience much pain relief after these procedures unfortunately. Pain is mostly over the anterior aspect of the knees and is described as aching, sometimes sharp. He also endorses grinding, popping, catching, and buckling. The patient denies numbness and tingling of the extremity. The pain is rated as severe. Any weightbearing activities makes the pain worse. Nothing makes the pain better. The patient has tried ice, anti-inflammatories, Norco, physical therapy, corticosteroid injections last given at the end of 2022, and viscosupplementation in January 2023, with no relief. Pt is on chronic Norco prescribed by his pain management doctor for history of chronic back pain s/p spine surgery. The patient also endorses bilateral hip pain and bilateral shoulder pain, and uses a cane for ambulation due to his global pain. PAST MEDICAL HISTORY:  has a past medical history of Anxiety, Depression, GERD (gastroesophageal reflux disease), Hypertension, Hypogonadism, Impotence, Memory disorder, and Seasonal allergic rhinitis.   Surgeries:   Past Surgical History:   Procedure Laterality Date    CERVICAL SPINE SURGERY  2014    C3 - C7 WITH CADAVER BONE AND CAGE    KNEE ARTHROPLASTY Left 2014    KNEE ARTHROPLASTY Right 2019    MANDIBLE SURGERY  fx    SHOULDER SURGERY Left 2014     Current Medications:   Current Outpatient Medications   Medication Sig Dispense Refill    DULoxetine (CYMBALTA) 60 MG extended release capsule TAKE 1 CAPSULE BY MOUTH DAILY      traZODone (DESYREL) 50 MG tablet Take 3 tablets by mouth

## 2023-05-01 NOTE — PROGRESS NOTES
BMI - Body mass index is 33.63 kg/m².   DM - yes - oral meds only  No results found for: [id number] - no  On blood thinners - no  Personal Hx of DVT/PE - no  Immediate Family Hx of DVT/PE - no  Afib - no  CAD - no  Hx of stroke/TIA - no  Kidney Disease - no  EtOH consumption - None  Narcotics pre-op - yes, Norco for back pain  Depression - yes  Anxiety - no  HIV - no  Hep C - no  DMARDs and/or biologics - none  Dentition - no upcoming dental work besides regular cleanings  Living arrangements - Caregiver, ranch, 2 steps into the house  Pets - [place] cassidy and cat

## 2023-05-15 ENCOUNTER — OFFICE VISIT (OUTPATIENT)
Dept: ORTHOPEDIC SURGERY | Age: 73
End: 2023-05-15
Payer: MEDICARE

## 2023-05-15 VITALS — HEIGHT: 72 IN | RESPIRATION RATE: 16 BRPM | BODY MASS INDEX: 33.59 KG/M2 | WEIGHT: 248 LBS

## 2023-05-15 DIAGNOSIS — M17.12 PRIMARY OSTEOARTHRITIS OF LEFT KNEE: Primary | ICD-10-CM

## 2023-05-15 DIAGNOSIS — M17.11 PRIMARY OSTEOARTHRITIS OF RIGHT KNEE: ICD-10-CM

## 2023-05-15 DIAGNOSIS — G89.4 CHRONIC PAIN SYNDROME: ICD-10-CM

## 2023-05-15 PROCEDURE — 99214 OFFICE O/P EST MOD 30 MIN: CPT | Performed by: ORTHOPAEDIC SURGERY

## 2023-05-15 PROCEDURE — 1123F ACP DISCUSS/DSCN MKR DOCD: CPT | Performed by: ORTHOPAEDIC SURGERY

## 2023-05-15 NOTE — PROGRESS NOTES
ORTHOPAEDIC NEW PATIENT NOTE    Chief Complaint   Patient presents with    Follow-up     Bilateral knee pain       HPI  5/15/23  72 y.o. male seen for evaluation of bilateral knee pain, discuss possible left total knee arthroplasty:  Patient reports chronic bilateral knee pain  Left worse than right  She received a right knee steroid injection on May 1, no relief with that he reports  He has history of previous bilateral knee surgeries, arthroscopic surgery  He reports the left knee was performed in 2014 at [facility]  He is in chronic pain management at [facility]  He has had previous cervical and lumbar spine fusion surgery at [address] [place] [address] and [facility]  Left knee pain is described as diffuse  Worse medially  He has chronic low back pain  Bilateral radiculopathy, left worse than right  He describes pain in his feet as well, but no overt numbness or tingling  He reports he has had previous bilateral knee steroid injections as well as viscosupplementation  He reports no relief with those  He reports he has done physical therapy in the past      5/1/23  This is a 72 y.o. male who has had bilateral knee pain for the past several years. The patient denies any inciting injury. The patient has had bilateral knee arthroscopies with partial meniscectomies in 2014 and 2018, unsure which knee was first.  He states he did not experience much pain relief after these procedures unfortunately. Pain is mostly over the anterior aspect of the knees and is described as aching, sometimes sharp. He also endorses grinding, popping, catching, and buckling. The patient denies numbness and tingling of the extremity. The pain is rated as severe. Any weightbearing activities makes the pain worse. Nothing makes the pain better. The patient has tried ice, anti-inflammatories, Norco, physical therapy, corticosteroid injections last given at the end of 2022, and viscosupplementation in January 2023, with no relief.  Pt is on chronic Norco prescribed